# Patient Record
Sex: MALE
[De-identification: names, ages, dates, MRNs, and addresses within clinical notes are randomized per-mention and may not be internally consistent; named-entity substitution may affect disease eponyms.]

---

## 2024-06-18 ENCOUNTER — APPOINTMENT (OUTPATIENT)
Dept: OTOLARYNGOLOGY | Facility: CLINIC | Age: 34
End: 2024-06-18
Payer: COMMERCIAL

## 2024-06-18 VITALS — WEIGHT: 165 LBS | HEIGHT: 71 IN | BODY MASS INDEX: 23.1 KG/M2

## 2024-06-18 DIAGNOSIS — R06.83 SNORING: ICD-10-CM

## 2024-06-18 DIAGNOSIS — Z78.9 OTHER SPECIFIED HEALTH STATUS: ICD-10-CM

## 2024-06-18 DIAGNOSIS — J34.89 OTHER SPECIFIED DISORDERS OF NOSE AND NASAL SINUSES: ICD-10-CM

## 2024-06-18 PROBLEM — Z00.00 ENCOUNTER FOR PREVENTIVE HEALTH EXAMINATION: Status: ACTIVE | Noted: 2024-06-18

## 2024-06-18 PROCEDURE — 99203 OFFICE O/P NEW LOW 30 MIN: CPT

## 2024-06-18 NOTE — PHYSICAL EXAM
[TextEntry] : What he is palpating is the lower edge of his lower lateral cartilage.   Heart tongue position 3/4. +1 tonsils. No lymphadenopathy or neck pathology.

## 2024-06-18 NOTE — ASSESSMENT
[FreeTextEntry1] : Snoring.  By history there is nothing in the way of apnea. His BMI is normal. Would recommend an oral appliance and referred him to a reputable sleep dentist. I explained to him what he is palpating is not pathologic. Follow-up on an as-needed basis.

## 2024-06-18 NOTE — PROCEDURE
[FreeTextEntry6] : PROCEDURE NOTES   PROCEDURE: Nasal endoscopy SURGEON: Dr. Marie INDICATIONS: Assess for chronic sinusitis. Verbal consent obtained. ANESTHESIA: The patient was placed in a sitting position.  Following application of the topical anesthetic and decongestant, exam was performed with a zero degree endoscope.  The scope was passed along the right nasal floor to the nasopharynx.  It was then passed into the region of the middle meatus, middle turbinate, and sphenoethmoid region.  An identical procedure was performed on the left side.  The following findings were noted:   The nasal mucosa was healthy appearing and the septum was roughly midline. The middle meatus and sphenoethmoid recesses were clear bilaterally. The Superior meatus was without lesion or infection.  The Inferior, Middle and Superior Turbinates were not enlarged and healthy in appearance.  There was not pus, polyps or mass.  The nasopharynx was normal.   Tolerated the procedure well.

## 2024-06-18 NOTE — HISTORY OF PRESENT ILLNESS
[de-identified] : Initial visit. His chief complaint is for snoring. He reports that his girlfriend has concerns with the snoring. By history there does not appear to be any evidence of obstructive sleep apnea syndrome. He has no daytime fatigue. He reports that the snoring is sometimes worse when he has a cold. Reports a fine nasal airway bilaterally.  Additionally an unrelated he reports that he palpated something in his right nose the other day.  He thinks this may be improved. Not having any pain or epistaxis.